# Patient Record
Sex: FEMALE | Race: BLACK OR AFRICAN AMERICAN | NOT HISPANIC OR LATINO | URBAN - METROPOLITAN AREA
[De-identification: names, ages, dates, MRNs, and addresses within clinical notes are randomized per-mention and may not be internally consistent; named-entity substitution may affect disease eponyms.]

---

## 2022-02-08 ENCOUNTER — EMERGENCY (EMERGENCY)
Facility: HOSPITAL | Age: 27
LOS: 1 days | Discharge: HOME | End: 2022-02-08
Admitting: EMERGENCY MEDICINE
Payer: COMMERCIAL

## 2022-02-08 VITALS
HEART RATE: 65 BPM | TEMPERATURE: 98 F | DIASTOLIC BLOOD PRESSURE: 68 MMHG | SYSTOLIC BLOOD PRESSURE: 100 MMHG | OXYGEN SATURATION: 100 % | RESPIRATION RATE: 18 BRPM

## 2022-02-08 VITALS
RESPIRATION RATE: 18 BRPM | HEIGHT: 71 IN | HEART RATE: 90 BPM | SYSTOLIC BLOOD PRESSURE: 132 MMHG | OXYGEN SATURATION: 97 % | WEIGHT: 139.99 LBS | TEMPERATURE: 99 F | DIASTOLIC BLOOD PRESSURE: 81 MMHG

## 2022-02-08 DIAGNOSIS — D57.1 SICKLE-CELL DISEASE WITHOUT CRISIS: ICD-10-CM

## 2022-02-08 DIAGNOSIS — D57.00 HB-SS DISEASE WITH CRISIS, UNSPECIFIED: ICD-10-CM

## 2022-02-08 LAB
ALBUMIN SERPL ELPH-MCNC: 4.1 G/DL — SIGNIFICANT CHANGE UP (ref 3.4–5)
ALP SERPL-CCNC: 52 U/L — SIGNIFICANT CHANGE UP (ref 40–120)
ALT FLD-CCNC: 20 U/L — SIGNIFICANT CHANGE UP (ref 12–42)
ANION GAP SERPL CALC-SCNC: 6 MMOL/L — LOW (ref 9–16)
AST SERPL-CCNC: 26 U/L — SIGNIFICANT CHANGE UP (ref 15–37)
BILIRUB SERPL-MCNC: 2.3 MG/DL — HIGH (ref 0.2–1.2)
BUN SERPL-MCNC: 9 MG/DL — SIGNIFICANT CHANGE UP (ref 7–23)
CALCIUM SERPL-MCNC: 8.7 MG/DL — SIGNIFICANT CHANGE UP (ref 8.5–10.5)
CHLORIDE SERPL-SCNC: 104 MMOL/L — SIGNIFICANT CHANGE UP (ref 96–108)
CO2 SERPL-SCNC: 26 MMOL/L — SIGNIFICANT CHANGE UP (ref 22–31)
CREAT SERPL-MCNC: 0.56 MG/DL — SIGNIFICANT CHANGE UP (ref 0.5–1.3)
GLUCOSE SERPL-MCNC: 97 MG/DL — SIGNIFICANT CHANGE UP (ref 70–99)
HCT VFR BLD CALC: 28.1 % — LOW (ref 34.5–45)
HGB BLD-MCNC: 9.3 G/DL — LOW (ref 11.5–15.5)
MCHC RBC-ENTMCNC: 23.8 PG — LOW (ref 27–34)
MCHC RBC-ENTMCNC: 33.1 GM/DL — SIGNIFICANT CHANGE UP (ref 32–36)
MCV RBC AUTO: 71.9 FL — LOW (ref 80–100)
NRBC # BLD: 1 /100 WBCS — HIGH (ref 0–0)
NT-PROBNP SERPL-SCNC: 35 PG/ML — SIGNIFICANT CHANGE UP
PLATELET # BLD AUTO: 153 K/UL — SIGNIFICANT CHANGE UP (ref 150–400)
POTASSIUM SERPL-MCNC: 4 MMOL/L — SIGNIFICANT CHANGE UP (ref 3.5–5.3)
POTASSIUM SERPL-SCNC: 4 MMOL/L — SIGNIFICANT CHANGE UP (ref 3.5–5.3)
PROT SERPL-MCNC: 7.2 G/DL — SIGNIFICANT CHANGE UP (ref 6.4–8.2)
RBC # BLD: 3.91 M/UL — SIGNIFICANT CHANGE UP (ref 3.8–5.2)
RBC # BLD: 3.91 M/UL — SIGNIFICANT CHANGE UP (ref 3.8–5.2)
RBC # FLD: 18.2 % — HIGH (ref 10.3–14.5)
RETICS #: 314.8 K/UL — HIGH (ref 25–125)
RETICS/RBC NFR: 8 % — HIGH (ref 0.5–2.5)
SODIUM SERPL-SCNC: 136 MMOL/L — SIGNIFICANT CHANGE UP (ref 132–145)
TROPONIN I, HIGH SENSITIVITY RESULT: <4 NG/L — SIGNIFICANT CHANGE UP
WBC # BLD: 6.33 K/UL — SIGNIFICANT CHANGE UP (ref 3.8–10.5)
WBC # FLD AUTO: 6.33 K/UL — SIGNIFICANT CHANGE UP (ref 3.8–10.5)

## 2022-02-08 PROCEDURE — 71046 X-RAY EXAM CHEST 2 VIEWS: CPT | Mod: 26

## 2022-02-08 PROCEDURE — 93010 ELECTROCARDIOGRAM REPORT: CPT

## 2022-02-08 PROCEDURE — 99285 EMERGENCY DEPT VISIT HI MDM: CPT

## 2022-02-08 RX ORDER — MORPHINE SULFATE 50 MG/1
4 CAPSULE, EXTENDED RELEASE ORAL ONCE
Refills: 0 | Status: DISCONTINUED | OUTPATIENT
Start: 2022-02-08 | End: 2022-02-08

## 2022-02-08 RX ORDER — SODIUM CHLORIDE 9 MG/ML
1000 INJECTION INTRAMUSCULAR; INTRAVENOUS; SUBCUTANEOUS ONCE
Refills: 0 | Status: COMPLETED | OUTPATIENT
Start: 2022-02-08 | End: 2022-02-08

## 2022-02-08 RX ORDER — KETOROLAC TROMETHAMINE 30 MG/ML
30 SYRINGE (ML) INJECTION ONCE
Refills: 0 | Status: DISCONTINUED | OUTPATIENT
Start: 2022-02-08 | End: 2022-02-08

## 2022-02-08 RX ADMIN — MORPHINE SULFATE 4 MILLIGRAM(S): 50 CAPSULE, EXTENDED RELEASE ORAL at 22:48

## 2022-02-08 RX ADMIN — Medication 30 MILLIGRAM(S): at 20:36

## 2022-02-08 RX ADMIN — MORPHINE SULFATE 4 MILLIGRAM(S): 50 CAPSULE, EXTENDED RELEASE ORAL at 21:00

## 2022-02-08 RX ADMIN — MORPHINE SULFATE 4 MILLIGRAM(S): 50 CAPSULE, EXTENDED RELEASE ORAL at 21:32

## 2022-02-08 RX ADMIN — Medication 30 MILLIGRAM(S): at 21:00

## 2022-02-08 RX ADMIN — MORPHINE SULFATE 4 MILLIGRAM(S): 50 CAPSULE, EXTENDED RELEASE ORAL at 22:02

## 2022-02-08 RX ADMIN — SODIUM CHLORIDE 1000 MILLILITER(S): 9 INJECTION INTRAMUSCULAR; INTRAVENOUS; SUBCUTANEOUS at 21:00

## 2022-02-08 RX ADMIN — SODIUM CHLORIDE 1000 MILLILITER(S): 9 INJECTION INTRAMUSCULAR; INTRAVENOUS; SUBCUTANEOUS at 21:32

## 2022-02-08 RX ADMIN — SODIUM CHLORIDE 1000 MILLILITER(S): 9 INJECTION INTRAMUSCULAR; INTRAVENOUS; SUBCUTANEOUS at 20:38

## 2022-02-08 RX ADMIN — MORPHINE SULFATE 4 MILLIGRAM(S): 50 CAPSULE, EXTENDED RELEASE ORAL at 20:36

## 2022-02-08 RX ADMIN — MORPHINE SULFATE 4 MILLIGRAM(S): 50 CAPSULE, EXTENDED RELEASE ORAL at 22:19

## 2022-02-08 NOTE — ED PROVIDER NOTE - OBJECTIVE STATEMENT
25yo F with h/o sickle cell disease presents today with a pain crisis that started at 4pm. she attempted tx at home with 10mg oxycodone without relief. the pain started in her lower back and spread to her left thigh which is typical of her pain crisis, however, today she describes a the pain also spreading up her back with tightness in her chest with some difficulty catching her breath which is not typically present in her pain crises. she is unsure whether she has had acute chest in the past but notes she did have a pain crisis and subsequently had PNA so they weren't sure if it was ACS - this was 4 years ago. she has had no transfusions. she follows with a pain management doctor Dr. Eder Parker at [facility] [phone] and was last seen in October. she has been admitted once in the past for her pain crisis but it was at a hospital in [place] many years ago. denies fever, chills, ha, vision changes, dizziness, nausea, vomiting, abd pain, numbness, tingling, weakness, hematuria, bloody stool. vaccinated for covid x 3. on progestin only OCP.

## 2022-02-08 NOTE — ED PROVIDER NOTE - NSFOLLOWUPINSTRUCTIONS_ED_ALL_ED_FT
CONTINUE TAKING YOUR HOME MEDICATIONS FOR YOUR SICKLE CELL DISEASE.     STAY WELL HYDRATED TO IMPROVE BLOOD FLOW.     RETURN TO ER FOR CHEST PAIN, SHORTNESS OF BREATH, FEVER, ANY OTHER CONCERNING SYMPTOMS.     CALL DR. AMARO TOMORROW TO SCHEDULE A FOLLOW UP APPOINTMENT AND LET HIM KNOW YOU WERE IN THE ED TODAY. TAKE COPY OF RESULTS WITH YOU TO YOUR APPOINTMENT.       Sickle Cell Crisis    WHAT YOU NEED TO KNOW:    A sickle cell crisis is a painful episode that occurs in people who have sickle cell anemia. It happens when sickle-shaped red blood cells (RBCs) block blood vessels. Blood and oxygen cannot get to tissues, causing pain. A sickle cell crisis can also damage your tissues and cause organ failure, such liver or kidney failure. A sickle cell crisis can become life-threatening.    DISCHARGE INSTRUCTIONS:    Call your local emergency number (911 in the [place]) if:   •You have shortness of breath or chest pain.      •You are a man and have an erection that is painful and does not go away.      •You lose vision in one or both eyes.      Seek care immediately if:   •You feel like you cannot cope with your pain, or you feel like hurting yourself.      •You have behavior changes, a seizure, or faint.      •You have a fever.      •You have abdominal pain, nausea, or vomiting.      •You have a different or worse headache.      •You have new weakness or numbness in your arm, leg, or face.      •You have new pain in any part of your body.      •Your urine is dark and you are urinating less than usual or not at all.      •You are dizzy, lightheaded, or faint.      Call your doctor if:   •You have any new signs or symptoms.      •You have blood in your urine.      •You are constipated or you have diarrhea.      •You have changes in your vision.      •You have increased fatigue.      •You plan to travel by airplane or to a high [facility].      •You have questions or concerns about your condition or care.      Medicines: You may need any of the following:  •Medicine may be given to decrease sickling of your RBCs. You may also need medicine to treat or prevent a bacterial infection.      •Prescription pain medicine may be given. Ask your healthcare provider how to take this medicine safely. Some prescription pain medicines contain acetaminophen. Do not take other medicines that contain acetaminophen without talking to your healthcare provider. Too much acetaminophen may cause liver damage. Prescription pain medicine may cause constipation. Ask your healthcare provider how to prevent or treat constipation.       •NSAIDs, such as ibuprofen, help decrease swelling, pain, and fever. This medicine is available with or without a doctor's order. NSAIDs can cause stomach bleeding or kidney problems in certain people. If you take blood thinner medicine, always ask your healthcare provider if NSAIDs are safe for you. Always read the medicine label and follow directions.      •Acetaminophen decreases pain and fever. It is available without a doctor's order. Ask how much to take and how often to take it. Follow directions. Read the labels of all other medicines you are using to see if they also contain acetaminophen, or ask your doctor or pharmacist. Acetaminophen can cause liver damage if not taken correctly. Do not use more than 4 grams (4,000 milligrams) total of acetaminophen in one day.       •Take your medicine as directed. Contact your healthcare provider if you think your medicine is not helping or if you have side effects. Tell him or her if you are allergic to any medicine. Keep a list of the medicines, vitamins, and herbs you take. Include the amounts, and when and why you take them. Bring the list or the pill bottles to follow-up visits. Carry your medicine list with you in case of an emergency.      Medical alert identification: Wear medical alert jewelry or carry a card that says you have sickle cell anemia. Ask your healthcare provider where to get these items.    Medical Alert Jewelry         Prevent a sickle cell crisis:   •Take vitamins and minerals as directed. Folic acid may help prevent blood vessel problems that can occur with sickle cell anemia. Zinc may decrease how often you have pain.      •Drink liquids as directed. Dehydration can increase your risk for a sickle cell crisis. Ask how much liquid to drink each day and which liquids are best for you.      •Balance rest and exercise. Rest during a sickle cell crisis. Over time, increase your activity to a moderate amount. Exercise as directed. Avoid exercise or activities that can cause injury, such as football. Ask about the best exercise plan for you.      •Wash your hands frequently. Handwashing can help prevent illness. Wash your hands before you prepare or eat food, and after you use the bathroom.  Handwashing           •Avoid quick changes in temperature. Do not go quickly from a warm place to a cold place. Get in a pool slowly instead of jumping in. Avoid getting too hot or too cold. Dress in light clothing in the summer and warm clothing in the winter.      •Do not smoke cigarettes or drink alcohol. These increase your risk for a sickle cell crisis. Nicotine and other chemicals in cigarettes and cigars can cause lung damage. Ask your healthcare provider for information if you currently smoke and need help to quit. E-cigarettes or smokeless tobacco still contain nicotine. Talk to your healthcare provider before you use these products.      •Ask about vaccines you may need. Vaccines can help prevent a viral infection that may lead to a sickle cell crisis. Get a flu shot as soon as recommended each year, usually in September or October. You may need pneumonia vaccines every 5 years. Your healthcare provider can tell you if you need other vaccines, and when to get them.      Follow up with your doctor as directed: You may need ongoing screening for conditions that can develop because of sickle cell disease. Examples include kidney disease, hypertension (high blood pressure), retinopathy (eye problems), and problems with your lungs. Write down your questions so you remember to ask them during your visits.

## 2022-02-08 NOTE — ED ADULT TRIAGE NOTE - CHIEF COMPLAINT QUOTE
Pt presents to ED c/o sickle cell crisis. Pt reports pain to back and chest with difficulty breathing. Pt speaking in full complete sentences. Pt took 10mg Oxycodone at 1600 today.

## 2022-02-08 NOTE — ED PROVIDER NOTE - CLINICAL SUMMARY MEDICAL DECISION MAKING FREE TEXT BOX
pt presents with c/o sickle cell pain crisis since 4pm. took 10mg oxycodone at home without relief. reports pain to back and left thigh which is typical but with some chest tightness. unclear whether prior history of ACS. follows with pain management listed in HPI. placed on O2 2L per NC on arrival, given toradol, fluids, and morphine x 2 doses with some improvement in pain. labs with anemia and elevated reticulocyte count as expected. cr, lfts, trop negative. cxr negative. afebrile with stable VS. anticipate discharge. pt presents with c/o sickle cell pain crisis since 4pm. took 10mg oxycodone at home without relief. reports pain to back and left thigh which is typical but with some chest tightness. unclear whether prior history of ACS. follows with pain management listed in HPI. placed on O2 2L per NC on arrival, given toradol, fluids, and morphine x 3 doses with some improvement in pain and resolution of cp/tightness/sob. labs with anemia and elevated reticulocyte count as expected. cr, lfts, trop negative. cxr negative. afebrile with stable VS. anticipate discharge. tolerating PO and has oxycodone at home to continue pain management.

## 2022-02-08 NOTE — ED PROVIDER NOTE - PATIENT PORTAL LINK FT
You can access the FollowMyHealth Patient Portal offered by [facility] by registering at the following website: [email or web address]. By joining [employer]’s FollowMyHealth portal, you will also be able to view your health information using other applications (apps) compatible with our system.

## 2022-02-08 NOTE — ED PROVIDER NOTE - PROGRESS NOTE DETAILS
rechecked pt - feeling improved. pain went from 5/10 to 4/10. reports feeling more relaxed and breathing issues have resolved. will give additional analgesia and fluids. rechecked pt - she notes pain is somewhat improving. offered 3rd dose of morphine but explained that if additional pain medication is required, she will need to be admitted for pain control. pt does not feel that she needs to be admitted. she is feeling improved since her arrival. will complete 2nd L NS and final dose of pain medication and then d/c.

## 2022-02-08 NOTE — ED ADULT NURSE NOTE - OBJECTIVE STATEMENT
Pt reports sickle cell crisis since 1600 today. States she took 10mg of oxycodone w/out relief. C/o SOB, back and BLE pain. Denies chest pain. Pt able to speak in full sentences. Oxygen saturation 97-98% on room air.

## 2022-05-20 NOTE — ED ADULT NURSE NOTE - NS_NURSE_DISC_TEACHING_YN_ED_ALL_ED
Have Your Skin Lesions Been Treated?: not been treated Is This A New Presentation, Or A Follow-Up?: Follow Up Skin Lesions How Severe Is Your Skin Lesion?: mild Yes
